# Patient Record
Sex: FEMALE | Race: WHITE | NOT HISPANIC OR LATINO | Employment: FULL TIME | URBAN - METROPOLITAN AREA
[De-identification: names, ages, dates, MRNs, and addresses within clinical notes are randomized per-mention and may not be internally consistent; named-entity substitution may affect disease eponyms.]

---

## 2017-12-15 ENCOUNTER — TELEPHONE (OUTPATIENT)
Dept: OTOLARYNGOLOGY | Facility: CLINIC | Age: 52
End: 2017-12-15

## 2017-12-15 NOTE — TELEPHONE ENCOUNTER
Spoke with patient who I instructed   DO not take meclizine when testing for Vertigo ,instruction mailed

## 2017-12-15 NOTE — TELEPHONE ENCOUNTER
----- Message from Gloria Bingham sent at 12/15/2017 11:39 AM CST -----  Contact: Griselda/Dr. Rosales in [place] at [phone]  tc pt-Pt is on Meclizine and does she need to d/c before her NP appt  [facility] Dr. Khan or just continue it.

## 2018-03-05 ENCOUNTER — CLINICAL SUPPORT (OUTPATIENT)
Dept: AUDIOLOGY | Facility: CLINIC | Age: 53
End: 2018-03-05
Payer: COMMERCIAL

## 2018-03-05 ENCOUNTER — OFFICE VISIT (OUTPATIENT)
Dept: OTOLARYNGOLOGY | Facility: CLINIC | Age: 53
End: 2018-03-05
Payer: COMMERCIAL

## 2018-03-05 VITALS — BODY MASS INDEX: 28.48 KG/M2 | HEIGHT: 67 IN | WEIGHT: 181.44 LBS

## 2018-03-05 DIAGNOSIS — H81.10 POSTURAL VERTIGO, UNSPECIFIED LATERALITY: Primary | ICD-10-CM

## 2018-03-05 DIAGNOSIS — R42 MIGRAINOUS DIZZINESS: Primary | ICD-10-CM

## 2018-03-05 DIAGNOSIS — H81.8X9 OTHER DISORDERS OF VESTIBULAR FUNCTION, UNSPECIFIED EAR: Primary | ICD-10-CM

## 2018-03-05 PROCEDURE — 99205 OFFICE O/P NEW HI 60 MIN: CPT | Mod: S$GLB,,, | Performed by: OTOLARYNGOLOGY

## 2018-03-05 PROCEDURE — 92557 COMPREHENSIVE HEARING TEST: CPT | Mod: S$GLB,,, | Performed by: AUDIOLOGIST

## 2018-03-05 PROCEDURE — 92540 BASIC VESTIBULAR EVALUATION: CPT | Mod: S$GLB,,, | Performed by: AUDIOLOGIST-HEARING AID FITTER

## 2018-03-05 PROCEDURE — 92537 CALORIC VSTBLR TEST W/REC: CPT | Mod: S$GLB,,, | Performed by: AUDIOLOGIST-HEARING AID FITTER

## 2018-03-05 PROCEDURE — 99999 PR PBB SHADOW E&M-EST. PATIENT-LVL III: CPT | Mod: PBBFAC,,, | Performed by: OTOLARYNGOLOGY

## 2018-03-05 PROCEDURE — 92550 TYMPANOMETRY & REFLEX THRESH: CPT | Mod: S$GLB,,, | Performed by: AUDIOLOGIST

## 2018-03-05 RX ORDER — AZELASTINE 1 MG/ML
SPRAY, METERED NASAL
COMMUNITY
Start: 2018-02-19

## 2018-03-05 RX ORDER — METHYLPREDNISOLONE 4 MG/1
TABLET ORAL
COMMUNITY
Start: 2018-01-26

## 2018-03-05 RX ORDER — DIAZEPAM 5 MG/1
TABLET ORAL
COMMUNITY
Start: 2018-02-19

## 2018-03-05 RX ORDER — FAMOTIDINE 40 MG/1
TABLET, FILM COATED ORAL
COMMUNITY
Start: 2018-03-02

## 2018-03-05 RX ORDER — TIZANIDINE HYDROCHLORIDE 4 MG/1
CAPSULE, GELATIN COATED ORAL
COMMUNITY
Start: 2018-01-14

## 2018-03-05 RX ORDER — MECLIZINE HYDROCHLORIDE 25 MG/1
TABLET ORAL
COMMUNITY
Start: 2017-12-15

## 2018-03-05 NOTE — LETTER
March 5, 2018      Estee Rosales MD  [address]  [place] [address]           [facility] - Otorhinolaryngology  [address]  [facility] [address]  Phone: [phone]  Fax: [phone]          Patient: Tressa Arellano   MR Number: [id number]   YOB: 1965   Date of Visit: 3/5/2018       Dear Dr. Estee Rosales:    Thank you for referring Tressa Arellano to me for evaluation. Attached you will find relevant portions of my assessment and plan of care.    If you have questions, please do not hesitate to call me. I look forward to following Tressa Arellano along with you.    Sincerely,    Luis Angel Khan MD    Enclosure  CC:  No Recipients    If you would like to receive this communication electronically, please contact [email or web address] or [phone] to request more information on [employer] Link access.    For providers and/or their staff who would like to refer a patient to [facility], please contact us through our one-stop-shop provider referral line, [facility], at [phone].    If you feel you have received this communication in error or would no longer like to receive these types of communications, please e-mail [email or web address]

## 2018-03-05 NOTE — PROGRESS NOTES
VNG/Postuography Evaluation    Referring physician:  Dr. Khan    52 y.o. female complains of dizziness, neck pain and lightheadedness.  Symptoms are provoked by quick head turns and looking up and have been recurring over the past year. Her most recent episode occurred in 2017. Her episodes are typically brief in duration lasting only for several minutes. She stated she saw a physical therapist to address her neck issues. Ms. Arellano reported her symptoms are improving. She reported a trial period wit [place] exercises but stated they aggravated her neck muscles. She denied taking any medications prior to testing.    Gaze nystagmus was absent.    Oculomotor function was normal.    Spontaneous nystagmus was absent.    The head-hanging left Hallpike was negative.    The head-hanging right Hallpike was negative.    Static positional nystagmus was absent.    Unilateral weakness: 6% (left)  Directional preponderance 6% (right beating)    RC: 8 d/s   d/s  RW: 27 d/s  LW: 23 d/s    Fixation suppression was normal.    Impression: Normal VNG; no evidence of vestibular system dysfunction including BPPV.

## 2018-03-05 NOTE — PROGRESS NOTES
"Subjective:       Patient ID: Tressa Arellano is a 52 y.o. female.    Chief Complaint: Dizziness    HPI:HISTORY OF PRESENT ILLNESS:  Tressa Arellano is a 52-year-old female referred in   today by Dr. Estee Rosales from [place].  Ms. Arellano has multi-month history of   a recurrent dizziness syndrome, which began fairly suddenly last year.  Since   then, she has had recurrent dizziness syndrome that occurs which is primarily   positional in nature, but is occasionally spontaneous.  It occurs mainly when   she paints her house and is apparently exposed to paint fumes.  She has no   related hearing loss, fullness, or tinnitus with these symptoms.  In addition,   she has difficulty with dizziness when she goes outside and works in the yard in   the bright light and lot of rapid motion.  She also has no otologic symptoms at   that time.  She does have a history of "TMJ" problems, which was associated   with some dizziness, but these have since resolved.  In addition, she has a   visually provoked dizziness that occurs from time to time while walking down the   aisle of a grocery store, and other visual triggers.  She does have   family members with migraine syndromes including her daughter and possibly her   mother.  She does have a history of having a caloric VNG in the past, which was   read as normal.  Her recent water caloric VNG here had fairly severe dizziness and   vertigo.    Otherwise, she is in good health.        /jenna [id number] mindy(s)        TEJAS/LAURA  dd: 2018 14:05:22 (CST)  td: 2018 09:32:58 (CST)  Doc ID   #[id number]  Job ID #[id number]    CC:        Past Medical History: Patient has a past medical history of Dizziness.    Past Surgical History: Patient has a past surgical history that includes  section () and plantar fasciitis  (Bilateral, 2015).    Social History: Patient reports that she has never smoked. She has never used smokeless tobacco. She reports that she does not drink " alcohol.    Family History: family history is not on file.    Medications:   Current Outpatient Prescriptions   Medication Sig    azelastine (ASTELIN) 137 mcg (0.1 %) nasal spray     diazePAM (VALIUM) 5 MG tablet     famotidine (PEPCID) 40 MG tablet     meclizine (ANTIVERT) 25 mg tablet     methylPREDNISolone (MEDROL DOSEPACK) 4 mg tablet     tiZANidine 4 mg Cap      No current facility-administered medications for this visit.        Allergies: Patient is allergic to omnicef [cefdinir].    Review of Systems   Constitutional: Negative for appetite change, chills, fatigue and fever.   HENT: Negative for congestion, ear discharge, facial swelling, hearing loss, postnasal drip, rhinorrhea, sore throat, tinnitus and trouble swallowing.    Eyes: Negative for photophobia, pain, discharge, redness, itching and visual disturbance.   Respiratory: Negative for apnea, cough, choking, chest tightness, shortness of breath, wheezing and stridor.    Cardiovascular: Negative for chest pain and palpitations.   Gastrointestinal: Negative for abdominal pain, constipation, diarrhea, nausea and vomiting.   Musculoskeletal: Negative for arthralgias, gait problem, joint swelling, myalgias, neck pain and neck stiffness.   Skin: Negative for color change, pallor, rash and wound.   Neurological: Positive for dizziness and light-headedness. Negative for tremors, seizures, syncope, facial asymmetry, speech difficulty, weakness, numbness and headaches.   Hematological: Negative for adenopathy. Does not bruise/bleed easily.   Psychiatric/Behavioral: Negative for agitation, behavioral problems, confusion, decreased concentration, dysphoric mood, hallucinations, sleep disturbance and suicidal ideas. The patient is not nervous/anxious and is not hyperactive.        Objective:      Physical Exam   Constitutional: She is oriented to person, place, and time. She appears well-developed and well-nourished. She is cooperative.  Non-toxic appearance.  She does not have a sickly appearance. She does not appear ill. No distress.   HENT:   Head: Normocephalic and atraumatic. Not macrocephalic and not microcephalic. Head is without raccoon's eyes, without Hayden's sign, without abrasion, without contusion, without laceration, without right periorbital erythema and without left periorbital erythema. Hair is normal.   Right Ear: Ear canal normal. No lacerations. No drainage, swelling or tenderness. No foreign bodies. No mastoid tenderness. Tympanic membrane is not injected, not scarred, not perforated, not erythematous, not retracted and not bulging. Tympanic membrane mobility is normal. No middle ear effusion. No hemotympanum. No decreased hearing is noted.   Left Ear: Ear canal normal. No lacerations. No drainage, swelling or tenderness. No foreign bodies. No mastoid tenderness. Tympanic membrane is not injected, not scarred, not perforated, not erythematous, not retracted and not bulging. Tympanic membrane mobility is normal.  No middle ear effusion. No hemotympanum. No decreased hearing is noted.   Nose: No mucosal edema, rhinorrhea, nose lacerations, sinus tenderness, nasal deformity, septal deviation or nasal septal hematoma. No epistaxis.  No foreign bodies. Right sinus exhibits no maxillary sinus tenderness. Left sinus exhibits no maxillary sinus tenderness.       CN II-XII Nl.    Romb: Neg    DHP; Neg.    EOM's nl.    TF's Nl.    Cereb: Nl.    VNG: WNL.         Eyes: Conjunctivae, EOM and lids are normal. Pupils are equal, round, and reactive to light.   Neck: Normal range of motion. Neck supple. No JVD present. No tracheal tenderness and no muscular tenderness present. No neck rigidity. No tracheal deviation, no edema, no erythema and normal range of motion present. No thyroid mass and no thyromegaly present.   Cardiovascular: Normal rate and regular rhythm.    Pulmonary/Chest: Effort normal. No accessory muscle usage or stridor. No apnea, no tachypnea and no  bradypnea. No respiratory distress.   Abdominal: Soft. Normal appearance.   Musculoskeletal: Normal range of motion.   Lymphadenopathy:        Head (right side): No submental, no submandibular, no tonsillar, no preauricular and no posterior auricular adenopathy present.        Head (left side): No submental, no submandibular, no tonsillar, no preauricular and no posterior auricular adenopathy present.     She has no cervical adenopathy.        Right cervical: No superficial cervical, no deep cervical and no posterior cervical adenopathy present.       Left cervical: No superficial cervical, no deep cervical and no posterior cervical adenopathy present.   Neurological: She is alert and oriented to person, place, and time. She displays no atrophy and no tremor. No cranial nerve deficit or sensory deficit. She exhibits normal muscle tone. She displays no seizure activity.   Skin: Skin is warm, dry and intact. No abrasion, no bruising, no burn, no ecchymosis, no laceration, no lesion and no rash noted. She is not diaphoretic. No erythema. No pallor.   Psychiatric: She has a normal mood and affect. Her behavior is normal. Judgment and thought content normal. Her speech is not rapid and/or pressured and not slurred. Cognition and memory are normal. She is communicative.   Nursing note and vitals reviewed.              Assessment:       1. Migrainous dizziness        Plan:         Migraine elim diet.    F/U Migraine specialist if no better,